# Patient Record
Sex: MALE | Race: WHITE
[De-identification: names, ages, dates, MRNs, and addresses within clinical notes are randomized per-mention and may not be internally consistent; named-entity substitution may affect disease eponyms.]

---

## 2018-02-10 ENCOUNTER — HOSPITAL ENCOUNTER (EMERGENCY)
Dept: HOSPITAL 24 - ER | Age: 23
Discharge: HOME | End: 2018-02-10
Payer: SELF-PAY

## 2018-02-10 VITALS — BODY MASS INDEX: 24.3 KG/M2

## 2018-02-10 VITALS — DIASTOLIC BLOOD PRESSURE: 76 MMHG | SYSTOLIC BLOOD PRESSURE: 123 MMHG

## 2018-02-10 DIAGNOSIS — R31.9: ICD-10-CM

## 2018-02-10 DIAGNOSIS — R10.12: ICD-10-CM

## 2018-02-10 DIAGNOSIS — B96.29: ICD-10-CM

## 2018-02-10 DIAGNOSIS — N30.01: Primary | ICD-10-CM

## 2018-02-10 LAB
ALBUMIN SERPL BCP-MCNC: 3.4 G/DL (ref 3.4–5)
ALP SERPL-CCNC: 62 UNITS/L (ref 46–116)
ALT SERPL W P-5'-P-CCNC: 41 UNITS/L (ref 12–78)
AMYLASE SERPL-CCNC: 49 UNITS/L (ref 25–115)
AST SERPL W P-5'-P-CCNC: 23 UNITS/L (ref 15–37)
BACTERIA URNS QL MICRO: (no result) /HPF
BASOPHILS # BLD AUTO: 0 X10^3/UL (ref 0–0.1)
BASOPHILS NFR BLD AUTO: 0.4 % (ref 0.2–1)
BILIRUB UR QL STRIP.AUTO: NEGATIVE
BUN SERPL-MCNC: 18 MG/DL (ref 7–18)
CALCIUM ALBUM COR SERPL-SCNC: (no result) MG/DL (ref 8.5–10.1)
CALCIUM ALBUM COR SERPL-SCNC: 131 MMOL/L (ref 136–145)
CALCIUM SERPL-MCNC: 8.8 MG/DL (ref 8.5–10.1)
CHLORIDE SERPL-SCNC: 96 MMOL/L (ref 98–107)
CLARITY UR: (no result)
CO2 SERPL-SCNC: 28.5 MMOL/L (ref 21–32)
COLOR UR AUTO: YELLOW
CREAT SERPL-MCNC: 1.23 MG/DL (ref 0.7–1.3)
EGFR  BLACK RACES: > 60 (ref 60–?)
EOSINOPHIL # BLD AUTO: 0 X10^3/UL (ref 0–0.2)
EOSINOPHIL NFR BLD AUTO: 0 % (ref 0.9–2.9)
ERYTHROCYTE [DISTWIDTH] IN BLOOD BY AUTOMATED COUNT: 12.6 % (ref 11.6–16.5)
GLUCOSE UR QL STRIP.AUTO: NEGATIVE
HCT VFR BLD AUTO: 39.6 % (ref 42–54)
HGB BLD-MCNC: 13.6 G/DL (ref 13.5–18)
KETONES UR QL STRIP.AUTO: (no result)
LEUKOCYTE ESTERASE UR QL STRIP.AUTO: (no result)
LIPASE SERPL-CCNC: 77 UNITS/L (ref 73–393)
LYMPHOCYTES # BLD AUTO: 0.8 X10^3/UL (ref 1.3–2.9)
LYMPHOCYTES NFR BLD AUTO: 6.5 % (ref 21–51)
MCH RBC QN AUTO: 32.1 PG (ref 27–34)
MCHC RBC AUTO-ENTMCNC: 34.5 G/DL (ref 33–35)
MCV RBC AUTO: 93.1 FL (ref 80–100)
MONOCYTES # BLD AUTO: 1.5 X10^3/UL (ref 0.3–0.8)
MONOCYTES NFR BLD AUTO: 12.5 % (ref 0–13)
NEUTROPHILS # BLD AUTO: 9.6 X10^3/UL (ref 2.2–4.8)
NEUTROPHILS NFR BLD AUTO: 80.6 % (ref 42–75)
NITRITE UR QL STRIP.AUTO: POSITIVE
PH UR STRIP.AUTO: 6 [PH] (ref 5–8)
PLAT MORPH BLD: NORMAL
PLATELET # BLD: 148 X10^3/UL (ref 150–450)
PMV BLD AUTO: 9.1 FL (ref 7.4–11)
PROT SERPL-MCNC: 7.8 G/DL (ref 6.4–8.2)
PROT UR QL STRIP.AUTO: (no result)
RBC # BLD AUTO: 4.25 X10^6/UL (ref 4.7–6)
RBC # UR STRIP.AUTO: (no result) /UL
RBC MORPH BLD: NORMAL
SODIUM SERPL-SCNC: 131 MMOL/L (ref 136–145)
SP GR UR STRIP.AUTO: 1.01 (ref 1–1.03)
SQUAMOUS URNS QL MICRO: NEGATIVE /HPF
UROBILINOGEN UR QL STRIP.AUTO: (no result)
WBC NRBC COR # BLD AUTO: 11.9 X10^3/UL (ref 3.6–10)

## 2018-02-10 PROCEDURE — 36415 COLL VENOUS BLD VENIPUNCTURE: CPT

## 2018-02-10 PROCEDURE — 99283 EMERGENCY DEPT VISIT LOW MDM: CPT

## 2018-02-10 PROCEDURE — G0434 DRUG SCREEN MULTI DRUG CLASS: HCPCS

## 2018-02-10 PROCEDURE — 81001 URINALYSIS AUTO W/SCOPE: CPT

## 2018-02-10 PROCEDURE — 87186 SC STD MICRODIL/AGAR DIL: CPT

## 2018-02-10 PROCEDURE — 99282 EMERGENCY DEPT VISIT SF MDM: CPT

## 2018-02-10 PROCEDURE — 87088 URINE BACTERIA CULTURE: CPT

## 2018-02-10 PROCEDURE — 80053 COMPREHEN METABOLIC PANEL: CPT

## 2018-02-10 PROCEDURE — 85025 COMPLETE CBC W/AUTO DIFF WBC: CPT

## 2018-02-10 PROCEDURE — 80307 DRUG TEST PRSMV CHEM ANLYZR: CPT

## 2018-02-10 PROCEDURE — 83690 ASSAY OF LIPASE: CPT

## 2018-02-10 PROCEDURE — 96372 THER/PROPH/DIAG INJ SC/IM: CPT

## 2018-02-10 PROCEDURE — 82150 ASSAY OF AMYLASE: CPT

## 2018-02-10 PROCEDURE — 87086 URINE CULTURE/COLONY COUNT: CPT

## 2018-02-10 PROCEDURE — 74176 CT ABD & PELVIS W/O CONTRAST: CPT

## 2018-02-10 NOTE — CT
STUDY: CT ABDOMEN AND PELVIS WITHOUT IV AND ORAL CONTRAST



HISTORY: Patient finds a kidney stone that he has been having trouble with not being able pass. Pain 
worse around left flank area.



Comparison: None.



Technique:  Multiple axial images of the abdomen and pelvis were obtained from the lung bases to the 
pubic symphysis without the administration of IV contrast.  



Findings:



The visualized portions of the lung bases are unremarkable.  



CT abdomen: 



The liver, gallbladder, spleen, pancreas, and adrenal glands are normal in appearance. No significant
 mesenteric lymphadenopathy or inflammatory stranding is appreciated. Both kidneys are normal in appe
arance. There is no evidence of nephrolithiasis or hydronephrosis. No perinephric or periureteric fat
 stranding is identified. Both ureters define a normal course.



The stomach is normal in appearance. The small bowel is normal in appearance, without evidence of bow
el wall thickening or small bowel obstruction. 



The terminal ileum and cecum are normal.  The appendix is not clearly identified. There is no evidenc
e of right lower quadrant fat stranding. The ascending and transverse colon are within normal limits.
  The descending colon is normal in appearance.



CT pelvis: The sigmoid colon is normal in appearance. The rectum is normal. The urinary bladder is no
rmal. There are several punctate calcifications in the pelvis, most consistent with phleboliths. Ther
e is 1 calcification in close proximity to the left ureter, but this appears to be external to the le
ft ureter. No abnormal fluid collections are identified in the pelvis. 



There is no evidence of acute osseous abnormality. 



IMPRESSION:

 

1.  No evidence of acute abdominal or pelvic abnormality. Please see above for detail





Reported By:Electronically Signed by ALEXANDER SHEPHERD MD at 2/10/2018 9:38:46 AM

## 2018-02-10 NOTE — DR.GENAD
HPI





- PCP


Primary Care Physician: [facility]





- Complaint/Symptoms


Chief Complaint Doctors Comments: Patient is complaining of left CVA and side 

pain for the past seven days getting worst today.  States he went to [facility] 

and they did a urine with blood in it and told him he was having a kidney 

stone.  States he has been drinking different things at home trying to pass the 

stone but has been unsuccessful.  States he has been drinking increased water, 

beers, pickle juice and lemon juice but it did not help.  States the pain is 12 

of 10.  State he has been crying with the pain earlier today.  States he was 

having left side and back pain initially but now it is going down his abdomen.  

States his urine is dark and he has been having nocturia 3-4 times nightly.  He 

denies fever, chills, cold, cough, diarrhea or delmar.  States he does not 

have a local doctor.


Chief Complaint:: PT C/O KIDNEY STONE THAT HE HAS BEEN HAVING TROUBLE WITH NO 

BEING ABLE TO PASS IT. PT STATES THE PAIN IS GETTING WORSE AROUND HIS LT FLANK 

AREA. PT STATES THE PAIN STARTED IN HIS LT BACK AREA AND NOW HAS MOVED. PT 

STATES HE HAS BEEN DRINKING PLENTY OF FLUIDS.





- Nurses notes reviewed


Nurses Notes Review: Yes





- Source


History Provided: Patient





- Mode of Arrival


Mode of Arrival: Ambulatory





- Timing


Onset of Chief Complaint: 18


Came on: Gradually





- Duration


Duration: Intermittent


How lon


Duration: Days





- Location


Location: left CVA and back pain





- Severity


Severity: Moderate





- Modifying Factors


Worsens:: movment


Improves:: nothing





PMH





- PMH


Past Medical History: No


Past Surgical History: Yes


Past Surgical History Comment: CHEST SURGERY.





- Family History


History of Family Medical Conditions: No





- Social History


Does any household member use tobacco: No


Alcohol Use: Rarely


Do you use any recreational Drugs:: No


Lives With: Family


Lives Where: Home





- infectious screening


In the last 2 months have you had wt loss of >10#?: NO


Have you had fever, night sweats or hemotysis?: No


Have you traveled outside the country in the last 6 months?: No


Isolation: Standard





ROS





- Review of Systems


Constitutional: No Symptoms Reported.  negative: See HPI, Chills, Diaphoresis, 

Fever, Malaise, Weakness, Irritable, Fatigue, Loss of Appetite, Other


Eyes: No Symptoms Reported.  negative: See HPI, Eye Pain, Blurred Vision, 

Tearing, Discharge, Photophobia, Diplopia, Other


ENTM: No Symptoms Reported.  negative: See HPI, Ear Pain, Ear Discharge, 

Pulling on Ears, Hearing Loss, Nose Pain, Nose Discharge, Epistaxis, Nose 

Congestion, Mouth Pain, Mouth Swelling, Loose Teeth, Drooling, Throat Pain, 

Throat Swelling, Ear Foreign Body


Respiratoy: No Symptoms Reported.  negative: See HPI, Productive Cough, Non-

Productive Cough, Moist Cough, Dry Cough, Hacking Cough, Barking Cough, Brassy 

Cough, Orthopnea, Short of Breath, Stridor, Wheezing, Hemoptysis, Other


Cardiovascular: No Symptoms Reported


Gastrointestinal/Abdominal: No Symptoms Reported, Abdominal Pain (left CVA pain)


Genitourinary: Frequency, Pain.  negative: No Symptoms Reported, See HPI, 

Discharge, Dysuria, Hematuria, Bleeding, Other


Neurological: No Symptoms Reported


Musculoskeletal: No Symptoms Reported, Left, Back


Integumentary: No Symptoms Reported.  negative: See HPI, Change in Color, 

Change in Hair/Nails, Dryness, Lesions, Lumps, Rash, Itching, Wound, Bruises, 

Juandice, Other


Hematologic/Lymphatic: No Symptoms Reported


Endocrine: No Symptoms Reported


Psychiatric: No Symptoms Reported





PE





- Vital Signs


Vitals: 


 





Temperature                      99.3 F


Pulse Rate                       86


Respiratory Rate                 20


Blood Pressure                   123/76


O2 Sat by Pulse Oximetry         97











- General


Limitations: No Limitations


General Appearance: Alert, In Distress (moderate)





- Head


Head Exam: Normal Inspection, Atraumatic, Normocephalic





- Eyes


Eye exam: Normal Appearance, PERRL, EOMI.  negative: Scleral Icterus, 

Conjunctival Injection, Nystagmus, Miosis, Mydrasis, Periorbital Swelling, 

Periorbital Tenderness, Other





- ENT


ENT Exam: Normal Exam, Normal Oropharynx, Normal  External Ear Exam, Mucous 

Membranes Moist, TM's Normal Bilaterally


External Ear Exam: Normal External Inspection


TM/Canal Exam: Bilateral Normal


Nose Exam: Normal Nose Exam


Mouth Exam: Normal Inspection.  negative: Drooling, Trismus, Lip Swelling, 

Tongue Elevation, Tongue Swelling, Laceration, Other


Throat Exam: Normal Inspection.  negative: Tonsillar Erythema, Tonsillomegaly, 

Tonsillar Exudate, R Peritonsillar Mass, L Peritonsillar Mass, Muffled Voice, 

Other





- Neck


Neck Exam: Normal Inspection, Full ROM, Trachea Midline.  negative: Tenderness, 

Meningismus, Lymphadenopathy, Thyromegaly, Other





- Chest


Chest Inspection: Normal Inspection, Symmetric Chest Wall Rise





- Respiratory


Respiratory Exam: Normal Lung Sounds Bilat


Respiratory Exam: Bilateral Clear to Auscultation





- Cardiovascular


Cardiovascular Exam: Regular Rate, Normal Rhythm, Normal Heart Sounds.  negative

: Bradycardia, Tachycardia, Irregular Rhythm, Systolic Murmur, Diastolic Murmur

, Rubs, Gallop, Clicks, JVD, +S1, +S2, +S3, +S4, Other





- Abdominal Exam


Abdominal Exam: Normal Inspection, Normal Bowel Sounds, Soft, Tenderness, 

Guarding


Abdominal Tenderness: LUQ, Moderate





- Extremities


Extremities Exam: Normal Inspection, Full ROM, Normal Capillary Refill.  

negative: Tenderness, Edema, Joint Swelling, Calf Tenderness, Other





- Back


Back Exam: Normal Inspection, Full ROM, (L) CVA Tenderness





- Neurologic


Neurological Exam: Alert, Oriented X3, CN II-XII Intact, Normal Gait, Reflexes 

Normal





- Psychiatric


Psychiatric Exam: Normal Affect, Normal Mood





- Skin


Skin Exam: Warm, Dry, Intact, Normal Color.  negative: Rash, Cyanosis, 

Diaphoresis, Erythema, Pallor, Mottled, Other





ROR





- Labs Reviewed


Laboratory Results Reviewed?: Yes (all labs and x-ray results reviewed and 

discussed with patient)


Result Diagrams: 


 02/10/18 09:15





 02/10/18 09:15


Laboratory: 


 











WBC  11.9 X10^3/uL (3.6-10.0)  H  02/10/18  09:15    


 


RBC  4.25 X10^6/uL (4.7-6.0)  L  02/10/18  09:15    


 


Hgb  13.6 g/dL (13.5-18.0)   02/10/18  09:15    


 


Hct  39.6 % (42.0-54.0)  L  02/10/18  09:15    


 


MCV  93.1 fL (80.0-100.0)   02/10/18  09:15    


 


MCH  32.1 pg (27.0-34.0)   02/10/18  09:15    


 


MCHC  34.5 g/dL (33.0-35.0)   02/10/18  09:15    


 


RDW  12.6 % (11.6-16.5)   02/10/18  09:15    


 


Plt Count  148 X10^3/uL (150.0-450.0)  L  02/10/18  09:15    


 


Plt Count Comment  Adequate  (ADEQUATE)   02/10/18  09:15    


 


MPV  9.1 fL (7.4-11.0)   02/10/18  09:15    


 


Neut %  80.6 % (42.0-75.0)  H  02/10/18  09:15    


 


Lymph %  6.5 % (21.0-51.0)  L  02/10/18  09:15    


 


Mono %  12.5 % (0.0-13.0)   02/10/18  09:15    


 


Eos %  0.0 % (0.9-2.9)  L  02/10/18  09:15    


 


Baso %  0.4 % (0.2-1.0)   02/10/18  09:15    


 


Neut #  9.6 x10^3/uL (2.2-4.8)  H  02/10/18  09:15    


 


Lymph #  0.8 X10^3/uL (1.3-2.9)  L  02/10/18  09:15    


 


Mono #  1.5 x10^3/uL (0.3-0.8)  H  02/10/18  09:15    


 


Eos #  0.0 x10^3/uL (0.0-0.2)   02/10/18  09:15    


 


Baso #  0.0 X10^3/uL (0.0-0.1)   02/10/18  09:15    


 


Absolute Nucleated RBC  0.0 /100WBC  02/10/18  09:15    


 


Total Counted  100   02/10/18  09:15    


 


Neutrophils % (Manual)  91 % (39-76)  H  02/10/18  09:15    


 


Lymphocytes % (Manual)  5 % (13-43)  L  02/10/18  09:15    


 


Monocytes % (Manual)  4 % (4-9)   02/10/18  09:15    


 


Plt Morphology Comment  Normal  (NORMAL)   02/10/18  09:15    


 


RBC Morphology  Normal  (NORMAL)   02/10/18  09:15    


 


Sodium  131 mmol/L (136-145)  L  02/10/18  09:15    


 


Corrected Sodium  131 mmol/L (136-145)  L  02/10/18  09:15    


 


Potassium  3.7 mmol/L (3.5-5.1)   02/10/18  09:15    


 


Chloride  96 mmol/L ()  L  02/10/18  09:15    


 


Carbon Dioxide  28.5 mmol/L (21-32)   02/10/18  09:15    


 


BUN  18 mg/dL (7-18)   02/10/18  09:15    


 


Creatinine  1.23 mg/dL (0.70-1.30)   02/10/18  09:15    


 


Est GFR (MDRD) Af Amer  > 60  (>60)   02/10/18  09:15    


 


Est GFR (MDRD) Non-Af  > 60  (>60)   02/10/18  09:15    


 


Glucose  118 mg/dL (65-99)  H  02/10/18  09:15    


 


Calcium  8.8 mg/dL (8.5-10.1)   02/10/18  09:15    


 


Corrected Calcium  TNP   02/10/18  09:15    


 


Total Bilirubin  0.30 mg/dL (0.2-1.0)   02/10/18  09:15    


 


AST  23 Units/L (15-37)   02/10/18  09:15    


 


ALT  41 Units/L (12-78)   02/10/18  09:15    


 


Alkaline Phosphatase  62 Units/L ()   02/10/18  09:15    


 


Total Protein  7.8 g/dL (6.4-8.2)   02/10/18  09:15    


 


Albumin  3.4 g/dL (3.4-5.0)   02/10/18  09:15    


 


Globulin  4.4 g/dL (2.5-4.5)   02/10/18  09:15    


 


Albumin/Globulin Ratio  0.8 Ratio (1.1-2.1)  L  02/10/18  09:15    


 


Amylase  49 Units/L ()   02/10/18  09:15    


 


Lipase  77 Units/L ()   02/10/18  09:15    


 


Specimen Type  Clean catch urine   02/10/18  09:19    


 


Urine Color  Yellow  (YELLOW)   02/10/18  09:19    


 


Urine Appearance  Hazy  (CLEAR)   02/10/18  09:19    


 


Urine pH  6.0  (5.0 - 8.0)   02/10/18  09:19    


 


Ur Specific Gravity  1.015  (1.000-1.030)   02/10/18  09:19    


 


Urine Protein  3+  (NEGATIVE)   02/10/18  09:19    


 


Urine Glucose (UA)  Negative  (NEGATIVE)   02/10/18  09:19    


 


Urine Ketones  3+  (NEGATIVE)   02/10/18  09:19    


 


Urine Occult Blood  5+  (NEGATIVE)   02/10/18  09:19    


 


Urine Nitrite  Positive  (NEGATIVE)   02/10/18  09:19    


 


Urine Bilirubin  Negative  (NEGATIVE)   02/10/18  09:19    


 


Urine Urobilinogen  2+  (NORMAL)   02/10/18  09:19    


 


Ur Leukocyte Esterase  2+  (NEGATIVE)   02/10/18  09:19    


 


Urine RBC  10-10 /HPF (NEGATIVE)   02/10/18  09:19    


 


Urine WBC  5-6 /HPF (NEGATIVE)   02/10/18  09:19    


 


Ur Squamous Epith Cells  Negative /HPF (NEGATIVE)   02/10/18  09:    


 


Urine Bacteria  Trace /HPF (NEGATIVE)   02/10/18  09:19    


 


Ur Culture Indicated?  No/not indicated   02/10/18  09:19    


 


Urine Opiates Screen  Negative  (NEG=<300)   02/10/18  09:    


 


Urine Methadone Screen  Negative  (NEG=<300)   02/10/18  09:19    


 


Ur Barbiturates Screen  Negative  (NEG=<200)   02/10/18  09:19    


 


Ur Phencyclidine Scrn  Negative  (NEG=<25)   02/10/18  09:19    


 


Ur Amphetamines Screen  Negative  (NEG=<1000)   02/10/18  09:19    


 


U Benzodiazepines Scrn  Negative  (NEG=<200)   02/10/18  09:19    


 


Urine Cocaine Screen  Negative  (NEG=<300)   02/10/18  09:19    


 


U Marijuana (THC) Screen  Positive  (NEG=<50)  A  02/10/18  09:19    














- Diagnosis


Discharge Problem: 


 Hematuria





Abdominal pain


Qualifiers:


 Abdominal location: left upper quadrant Qualified Code(s): R10.12 - Left upper 

quadrant pain





Urinary tract infection


Qualifiers:


 Urinary tract infection type: acute cystitis Hematuria presence: with 

hematuria Qualified Code(s): N30.01 - Acute cystitis with hematuria








- Discharge Plan


Disposition:  HOME, SELF-CARE


Condition: Stable


Prescriptions: 


Ketorolac Tromethamine [Toradol Tab] 10 mg PO BID PRN #8 tab


 PRN Reason: Pain


Levofloxacin [LEVAQUIN  MG *] 500 mg PO Q24H #10 tab





- Follow ups/Referrals


Follow ups/Referrals: 


[facility],None [Primary Care Provider] - 3 days


THEA HONG [STAFF PHYSICIAN] - 3 days





- Instructions


Instructions:  Urinary Tract Infection, Adult